# Patient Record
Sex: FEMALE
[De-identification: names, ages, dates, MRNs, and addresses within clinical notes are randomized per-mention and may not be internally consistent; named-entity substitution may affect disease eponyms.]

---

## 2021-03-03 ENCOUNTER — RESULT REVIEW (OUTPATIENT)
Age: 26
End: 2021-03-03

## 2022-09-21 PROBLEM — Z00.00 ENCOUNTER FOR PREVENTIVE HEALTH EXAMINATION: Status: ACTIVE | Noted: 2022-09-21

## 2023-01-18 ENCOUNTER — APPOINTMENT (OUTPATIENT)
Dept: COLORECTAL SURGERY | Facility: CLINIC | Age: 28
End: 2023-01-18

## 2023-03-01 ENCOUNTER — APPOINTMENT (OUTPATIENT)
Dept: COLORECTAL SURGERY | Facility: CLINIC | Age: 28
End: 2023-03-01
Payer: MEDICAID

## 2023-03-01 ENCOUNTER — TRANSCRIPTION ENCOUNTER (OUTPATIENT)
Age: 28
End: 2023-03-01

## 2023-03-01 VITALS
WEIGHT: 145 LBS | BODY MASS INDEX: 21.98 KG/M2 | HEIGHT: 68 IN | DIASTOLIC BLOOD PRESSURE: 86 MMHG | RESPIRATION RATE: 14 BRPM | TEMPERATURE: 97.5 F | SYSTOLIC BLOOD PRESSURE: 138 MMHG | HEART RATE: 91 BPM

## 2023-03-01 DIAGNOSIS — R19.4 CHANGE IN BOWEL HABIT: ICD-10-CM

## 2023-03-01 DIAGNOSIS — Z80.0 FAMILY HISTORY OF MALIGNANT NEOPLASM OF DIGESTIVE ORGANS: ICD-10-CM

## 2023-03-01 DIAGNOSIS — Z78.9 OTHER SPECIFIED HEALTH STATUS: ICD-10-CM

## 2023-03-01 DIAGNOSIS — K64.8 OTHER HEMORRHOIDS: ICD-10-CM

## 2023-03-01 PROCEDURE — 46600 DIAGNOSTIC ANOSCOPY SPX: CPT

## 2023-03-01 PROCEDURE — 99204 OFFICE O/P NEW MOD 45 MIN: CPT | Mod: 25

## 2023-03-01 RX ORDER — HYDROCORTISONE 25 MG/G
2.5 CREAM TOPICAL
Qty: 30 | Refills: 6 | Status: ACTIVE | COMMUNITY
Start: 2023-03-01 | End: 1900-01-01

## 2023-03-01 NOTE — PHYSICAL EXAM
[Normal rectal exam] : exam was normal [Reduce Spontaneously] : a spontaneously reducible (grade II) [Normal] : was normal [None] : there was no rectal mass  [No Rash or Lesion] : No rash or lesion [Alert] : alert [Oriented to Person] : oriented to person [Oriented to Place] : oriented to place [Oriented to Time] : oriented to time [Calm] : calm [Skin Tags] : there were no residual hemorrhoidal skin tags seen [Gross Blood] : no gross blood [de-identified] : No apparent distress [de-identified] : Normocephalic atraumatic [de-identified] : Moving all extremities x4

## 2023-03-01 NOTE — PROCEDURE
[FreeTextEntry1] : Anoscopy performed after pt consent was obtained. Circumferential visualization of the anal canal  above the level of the dentate line was completed.\par \par

## 2023-03-01 NOTE — PHYSICAL EXAM
[Normal rectal exam] : exam was normal [Reduce Spontaneously] : a spontaneously reducible (grade II) [Normal] : was normal [None] : there was no rectal mass  [No Rash or Lesion] : No rash or lesion [Alert] : alert [Oriented to Person] : oriented to person [Oriented to Place] : oriented to place [Oriented to Time] : oriented to time [Calm] : calm [Skin Tags] : there were no residual hemorrhoidal skin tags seen [Gross Blood] : no gross blood [de-identified] : No apparent distress [de-identified] : Normocephalic atraumatic [de-identified] : Moving all extremities x4

## 2023-03-01 NOTE — ASSESSMENT
[FreeTextEntry1] : Ms. Arce presents to the office for with reports of an internal hemorrhoidal flare. The symptoms include burning/itching/bleeding after bowel movements. We discussed the fact that all individuals possess hemorrhoids, but they are not notable, except in their symptomatic state. The pathophysiology of hemorrhoidal flares were reviewed, including the contribution of straining, hard stools, diarrheal stools in precipitating the symptoms. In order to regulate bowel regimen and prevent additional flares, recommendations were made for a high fiber diet (25-30g daily)  with ample water intake (80oz daily) +/- MiraLax daily. To alleviate the current hemorrhoidal discomfort, Analpram cream 2.5% or its equivalent will be prescribed tid to be applied intra-anally for at least 2 weeks. I have cautioned the patient that symptoms may not improve for 2-3 days time, as the cream slowly becomes effective. If there is no clinical improvement with this above regimen, a return visit is warranted for further evaluation.\par The risks/benefits/alternatives for a colonoscopy were discussed. These include a less than 1% risk of bleeding should any polyps be biopsied and/or removed. There is also a less than 0.1% risk of perforation. The patient understands the need to adhere to a clear liquid diet the day prior to procedure as well as having to perform a bowel prep in order to allow for adequate visualization of the mucosal surfaces.  Followup colonoscopies will be scheduled based on the findings that are seen at the time of the procedure. Patient understands and is agreeable, and will proceed with consent and scheduling.\par

## 2023-03-01 NOTE — HISTORY OF PRESENT ILLNESS
[FreeTextEntry1] : Ms. Arce presents to the office to discuss hemorrhoidal issues as well as for colonoscopy.  She reports bowel movements are inconsistent and she is experiencing abdominal pain, discomfort and bloating.   She denies any over rectal bleeding. She does admit to hemorrhoidal burning and itching after bowel movements.  She has not tried any hemorrhoidal creams.

## 2023-05-31 ENCOUNTER — NON-APPOINTMENT (OUTPATIENT)
Age: 28
End: 2023-05-31

## 2023-06-01 ENCOUNTER — APPOINTMENT (OUTPATIENT)
Dept: COLORECTAL SURGERY | Facility: AMBULATORY MEDICAL SERVICES | Age: 28
End: 2023-06-01

## 2024-11-25 ENCOUNTER — APPOINTMENT (OUTPATIENT)
Dept: HEPATOLOGY | Facility: CLINIC | Age: 29
End: 2024-11-25
Payer: COMMERCIAL

## 2024-11-25 VITALS
HEIGHT: 68 IN | HEART RATE: 100 BPM | RESPIRATION RATE: 16 BRPM | OXYGEN SATURATION: 98 % | SYSTOLIC BLOOD PRESSURE: 122 MMHG | WEIGHT: 161 LBS | BODY MASS INDEX: 24.4 KG/M2 | DIASTOLIC BLOOD PRESSURE: 77 MMHG

## 2024-11-25 DIAGNOSIS — E83.19 OTHER DISORDERS OF IRON METABOLISM: ICD-10-CM

## 2024-11-25 DIAGNOSIS — R94.5 ABNORMAL RESULTS OF LIVER FUNCTION STUDIES: ICD-10-CM

## 2024-11-25 PROCEDURE — 99205 OFFICE O/P NEW HI 60 MIN: CPT

## 2024-11-25 RX ORDER — SPIRONOLACTONE 50 MG/1
50 TABLET ORAL DAILY
Qty: 30 | Refills: 2 | Status: ACTIVE | COMMUNITY

## 2024-11-26 LAB
A1AT SERPL-MCNC: 161 MG/DL
ALBUMIN SERPL ELPH-MCNC: 4.6 G/DL
ALP BLD-CCNC: 178 U/L
ALT SERPL-CCNC: 71 U/L
ANION GAP SERPL CALC-SCNC: 14 MMOL/L
AST SERPL-CCNC: 51 U/L
BILIRUB DIRECT SERPL-MCNC: 0.1 MG/DL
BILIRUB SERPL-MCNC: 0.4 MG/DL
BUN SERPL-MCNC: 7 MG/DL
CALCIUM SERPL-MCNC: 10.3 MG/DL
CERULOPLASMIN SERPL-MCNC: 28 MG/DL
CHLORIDE SERPL-SCNC: 101 MMOL/L
CO2 SERPL-SCNC: 22 MMOL/L
CREAT SERPL-MCNC: 0.63 MG/DL
DEPRECATED KAPPA LC FREE/LAMBDA SER: 1.43 RATIO
EGFR: 122 ML/MIN/1.73M2
ESTIMATED AVERAGE GLUCOSE: 100 MG/DL
FERRITIN SERPL-MCNC: 18 NG/ML
GGT SERPL-CCNC: 108 U/L
GLUCOSE SERPL-MCNC: 97 MG/DL
HBA1C MFR BLD HPLC: 5.1 %
HCT VFR BLD CALC: 42.8 %
HGB BLD-MCNC: 13.7 G/DL
IGA SER QL IEP: 113 MG/DL
IGG SER QL IEP: 1048 MG/DL
IGM SER QL IEP: 113 MG/DL
INR PPP: 0.95 RATIO
IRON SATN MFR SERPL: 10 %
IRON SERPL-MCNC: 40 UG/DL
KAPPA LC CSF-MCNC: 1.03 MG/DL
KAPPA LC SERPL-MCNC: 1.47 MG/DL
MCHC RBC-ENTMCNC: 25.3 PG
MCHC RBC-ENTMCNC: 32 G/DL
MCV RBC AUTO: 79.1 FL
PLATELET # BLD AUTO: 305 K/UL
POTASSIUM SERPL-SCNC: 4.9 MMOL/L
PROT SERPL-MCNC: 7.5 G/DL
PT BLD: 11.2 SEC
RBC # BLD: 5.41 M/UL
RBC # FLD: 14.6 %
SODIUM SERPL-SCNC: 137 MMOL/L
TIBC SERPL-MCNC: 394 UG/DL
TTG IGA SER IA-ACNC: <0.5 U/ML
TTG IGA SER-ACNC: NEGATIVE
UIBC SERPL-MCNC: 353 UG/DL
WBC # FLD AUTO: 8.83 K/UL

## 2024-11-28 LAB — ANA SER IF-ACNC: NEGATIVE

## 2024-11-29 LAB — MITOCHONDRIAL (M2) AB, SERUM: <20 UNITS

## 2024-12-02 LAB
SMOOTH MUSCLE AB SER QL IF: ABNORMAL
TM INTERPRETATION: NORMAL

## 2024-12-04 LAB
ALP BLD-CCNC: 184 IU/L
ALP BONE CFR SERPL: 27 %
ALP INTEST CFR SERPL: 18 %
ALP LIVER CFR SERPL: 55 %

## 2024-12-17 ENCOUNTER — RESULT REVIEW (OUTPATIENT)
Age: 29
End: 2024-12-17

## 2024-12-23 ENCOUNTER — NON-APPOINTMENT (OUTPATIENT)
Age: 29
End: 2024-12-23

## 2025-01-02 RX ORDER — URSODIOL 300 MG/1
300 CAPSULE ORAL
Qty: 60 | Refills: 3 | Status: ACTIVE | COMMUNITY
Start: 2025-01-02 | End: 1900-01-01

## 2025-01-06 DIAGNOSIS — R94.5 ABNORMAL RESULTS OF LIVER FUNCTION STUDIES: ICD-10-CM

## 2025-01-06 LAB
ALBUMIN SERPL ELPH-MCNC: 4.5 G/DL
ALP BLD-CCNC: 182 U/L
ALT SERPL-CCNC: 42 U/L
ANION GAP SERPL CALC-SCNC: 14 MMOL/L
AST SERPL-CCNC: 25 U/L
BILIRUB SERPL-MCNC: 0.5 MG/DL
BUN SERPL-MCNC: 8 MG/DL
CALCIUM SERPL-MCNC: 9.9 MG/DL
CHLORIDE SERPL-SCNC: 102 MMOL/L
CO2 SERPL-SCNC: 22 MMOL/L
CREAT SERPL-MCNC: 0.6 MG/DL
EGFR: 124 ML/MIN/1.73M2
GGT SERPL-CCNC: 79 U/L
GLUCOSE SERPL-MCNC: 91 MG/DL
POTASSIUM SERPL-SCNC: 4.2 MMOL/L
PROT SERPL-MCNC: 7.1 G/DL
SODIUM SERPL-SCNC: 138 MMOL/L

## 2025-04-14 ENCOUNTER — APPOINTMENT (OUTPATIENT)
Dept: HEPATOLOGY | Facility: CLINIC | Age: 30
End: 2025-04-14

## 2025-05-12 DIAGNOSIS — R94.5 ABNORMAL RESULTS OF LIVER FUNCTION STUDIES: ICD-10-CM

## 2025-07-23 ENCOUNTER — APPOINTMENT (OUTPATIENT)
Dept: HEPATOLOGY | Facility: CLINIC | Age: 30
End: 2025-07-23
Payer: MEDICAID

## 2025-07-23 DIAGNOSIS — R94.5 ABNORMAL RESULTS OF LIVER FUNCTION STUDIES: ICD-10-CM

## 2025-07-23 PROCEDURE — 99215 OFFICE O/P EST HI 40 MIN: CPT | Mod: 95

## 2025-08-11 ENCOUNTER — APPOINTMENT (OUTPATIENT)
Dept: DERMATOLOGY | Facility: CLINIC | Age: 30
End: 2025-08-11
Payer: MEDICAID

## 2025-08-11 ENCOUNTER — NON-APPOINTMENT (OUTPATIENT)
Age: 30
End: 2025-08-11

## 2025-08-11 DIAGNOSIS — L70.0 ACNE VULGARIS: ICD-10-CM

## 2025-08-11 DIAGNOSIS — Z84.0 FAMILY HISTORY OF DISEASES OF THE SKIN AND SUBCUTANEOUS TISSUE: ICD-10-CM

## 2025-08-11 PROCEDURE — 99204 OFFICE O/P NEW MOD 45 MIN: CPT | Mod: 25

## 2025-08-11 PROCEDURE — 11900 INJECT SKIN LESIONS </W 7: CPT

## 2025-08-11 RX ORDER — TRETINOIN 0.5 MG/G
0.05 CREAM TOPICAL
Qty: 1 | Refills: 2 | Status: ACTIVE | COMMUNITY
Start: 2025-08-11 | End: 1900-01-01

## 2025-08-11 RX ORDER — SULFAMETHOXAZOLE AND TRIMETHOPRIM 800; 160 MG/1; MG/1
800-160 TABLET ORAL
Qty: 180 | Refills: 1 | Status: ACTIVE | COMMUNITY
Start: 2025-08-11 | End: 1900-01-01

## 2025-08-11 RX ORDER — NAPROXEN 500 MG/1
500 TABLET ORAL
Refills: 0 | Status: ACTIVE | COMMUNITY

## 2025-08-11 RX ORDER — BENZOYL PEROXIDE 100 MG/ML
10 LIQUID TOPICAL
Qty: 1 | Refills: 5 | Status: ACTIVE | COMMUNITY
Start: 2025-08-11 | End: 1900-01-01

## 2025-09-03 ENCOUNTER — NON-APPOINTMENT (OUTPATIENT)
Age: 30
End: 2025-09-03

## 2025-09-09 DIAGNOSIS — R94.5 ABNORMAL RESULTS OF LIVER FUNCTION STUDIES: ICD-10-CM

## 2025-09-12 ENCOUNTER — APPOINTMENT (OUTPATIENT)
Dept: CARDIOLOGY | Facility: CLINIC | Age: 30
End: 2025-09-12

## 2025-09-12 VITALS
HEART RATE: 77 BPM | HEIGHT: 68 IN | SYSTOLIC BLOOD PRESSURE: 110 MMHG | DIASTOLIC BLOOD PRESSURE: 80 MMHG | WEIGHT: 153 LBS | BODY MASS INDEX: 23.19 KG/M2 | OXYGEN SATURATION: 99 %

## 2025-09-12 PROCEDURE — 99204 OFFICE O/P NEW MOD 45 MIN: CPT | Mod: 25

## 2025-09-12 PROCEDURE — 93000 ELECTROCARDIOGRAM COMPLETE: CPT

## 2025-09-19 ENCOUNTER — APPOINTMENT (OUTPATIENT)
Dept: CARDIOLOGY | Facility: CLINIC | Age: 30
End: 2025-09-19

## 2025-09-22 PROBLEM — E78.5 DYSLIPIDEMIA: Status: ACTIVE | Noted: 2025-09-22

## 2025-09-22 PROBLEM — R06.09 DYSPNEA ON EXERTION: Status: ACTIVE | Noted: 2025-09-22

## 2025-09-22 PROBLEM — R07.9 CHEST PAIN, EXERTIONAL: Status: ACTIVE | Noted: 2025-09-22
